# Patient Record
Sex: MALE | Race: WHITE | NOT HISPANIC OR LATINO | URBAN - METROPOLITAN AREA
[De-identification: names, ages, dates, MRNs, and addresses within clinical notes are randomized per-mention and may not be internally consistent; named-entity substitution may affect disease eponyms.]

---

## 2017-01-05 ENCOUNTER — OUTPATIENT (OUTPATIENT)
Dept: OUTPATIENT SERVICES | Facility: HOSPITAL | Age: 77
LOS: 1 days | End: 2017-01-05
Payer: MEDICARE

## 2017-01-05 ENCOUNTER — APPOINTMENT (OUTPATIENT)
Dept: ULTRASOUND IMAGING | Facility: IMAGING CENTER | Age: 77
End: 2017-01-05

## 2017-01-05 DIAGNOSIS — Z00.8 ENCOUNTER FOR OTHER GENERAL EXAMINATION: ICD-10-CM

## 2017-01-05 PROCEDURE — 76536 US EXAM OF HEAD AND NECK: CPT

## 2017-07-05 ENCOUNTER — APPOINTMENT (OUTPATIENT)
Dept: MRI IMAGING | Facility: IMAGING CENTER | Age: 77
End: 2017-07-05

## 2017-07-05 ENCOUNTER — OUTPATIENT (OUTPATIENT)
Dept: OUTPATIENT SERVICES | Facility: HOSPITAL | Age: 77
LOS: 1 days | End: 2017-07-05
Payer: MEDICARE

## 2017-07-05 DIAGNOSIS — Z00.8 ENCOUNTER FOR OTHER GENERAL EXAMINATION: ICD-10-CM

## 2017-07-05 PROCEDURE — 72148 MRI LUMBAR SPINE W/O DYE: CPT

## 2017-07-11 ENCOUNTER — APPOINTMENT (OUTPATIENT)
Dept: MRI IMAGING | Facility: CLINIC | Age: 77
End: 2017-07-11
Payer: MEDICARE

## 2017-07-11 ENCOUNTER — OUTPATIENT (OUTPATIENT)
Dept: OUTPATIENT SERVICES | Facility: HOSPITAL | Age: 77
LOS: 1 days | End: 2017-07-11
Payer: MEDICARE

## 2017-07-11 DIAGNOSIS — Z00.8 ENCOUNTER FOR OTHER GENERAL EXAMINATION: ICD-10-CM

## 2017-07-11 PROCEDURE — 74183 MRI ABD W/O CNTR FLWD CNTR: CPT | Mod: 26

## 2017-07-11 PROCEDURE — 82565 ASSAY OF CREATININE: CPT

## 2017-07-11 PROCEDURE — A9585: CPT

## 2017-07-11 PROCEDURE — 74183 MRI ABD W/O CNTR FLWD CNTR: CPT

## 2017-07-21 ENCOUNTER — RESULT REVIEW (OUTPATIENT)
Age: 77
End: 2017-07-21

## 2017-07-21 ENCOUNTER — OUTPATIENT (OUTPATIENT)
Dept: OUTPATIENT SERVICES | Facility: HOSPITAL | Age: 77
LOS: 1 days | End: 2017-07-21
Payer: MEDICARE

## 2017-07-21 DIAGNOSIS — K86.9 DISEASE OF PANCREAS, UNSPECIFIED: ICD-10-CM

## 2017-07-21 PROCEDURE — 43259 EGD US EXAM DUODENUM/JEJUNUM: CPT

## 2017-07-21 PROCEDURE — 43239 EGD BIOPSY SINGLE/MULTIPLE: CPT | Mod: XS

## 2017-07-24 LAB — SURGICAL PATHOLOGY STUDY: SIGNIFICANT CHANGE UP

## 2017-10-09 ENCOUNTER — EMERGENCY (EMERGENCY)
Facility: HOSPITAL | Age: 77
LOS: 1 days | Discharge: HOME | End: 2017-10-09
Attending: EMERGENCY MEDICINE
Payer: MEDICARE

## 2017-10-09 VITALS
WEIGHT: 169.98 LBS | OXYGEN SATURATION: 98 % | TEMPERATURE: 98 F | DIASTOLIC BLOOD PRESSURE: 89 MMHG | HEART RATE: 83 BPM | RESPIRATION RATE: 18 BRPM | SYSTOLIC BLOOD PRESSURE: 134 MMHG

## 2017-10-09 PROCEDURE — 73522 X-RAY EXAM HIPS BI 3-4 VIEWS: CPT

## 2017-10-09 PROCEDURE — 73110 X-RAY EXAM OF WRIST: CPT

## 2017-10-09 PROCEDURE — 70450 CT HEAD/BRAIN W/O DYE: CPT | Mod: 26

## 2017-10-09 PROCEDURE — 99284 EMERGENCY DEPT VISIT MOD MDM: CPT | Mod: 25

## 2017-10-09 PROCEDURE — 99285 EMERGENCY DEPT VISIT HI MDM: CPT | Mod: 25

## 2017-10-09 PROCEDURE — 70450 CT HEAD/BRAIN W/O DYE: CPT

## 2017-10-09 PROCEDURE — 73522 X-RAY EXAM HIPS BI 3-4 VIEWS: CPT | Mod: 26

## 2017-10-09 PROCEDURE — 73110 X-RAY EXAM OF WRIST: CPT | Mod: 26,LT

## 2017-10-09 PROCEDURE — 70486 CT MAXILLOFACIAL W/O DYE: CPT | Mod: 26

## 2017-10-09 PROCEDURE — 73130 X-RAY EXAM OF HAND: CPT | Mod: 26,LT

## 2017-10-09 PROCEDURE — 73130 X-RAY EXAM OF HAND: CPT

## 2017-10-09 PROCEDURE — 70486 CT MAXILLOFACIAL W/O DYE: CPT

## 2017-10-09 RX ORDER — ACETAMINOPHEN 500 MG
975 TABLET ORAL ONCE
Qty: 0 | Refills: 0 | Status: COMPLETED | OUTPATIENT
Start: 2017-10-09 | End: 2017-10-09

## 2017-10-09 RX ORDER — IBUPROFEN 200 MG
1 TABLET ORAL
Qty: 15 | Refills: 0 | OUTPATIENT
Start: 2017-10-09 | End: 2017-10-14

## 2017-10-09 RX ORDER — BACITRACIN ZINC 500 UNIT/G
1 OINTMENT IN PACKET (EA) TOPICAL ONCE
Qty: 0 | Refills: 0 | Status: COMPLETED | OUTPATIENT
Start: 2017-10-09 | End: 2017-10-09

## 2017-10-09 RX ORDER — ACETAMINOPHEN 500 MG
2 TABLET ORAL
Qty: 56 | Refills: 0 | OUTPATIENT
Start: 2017-10-09 | End: 2017-10-16

## 2017-10-09 RX ADMIN — Medication 975 MILLIGRAM(S): at 21:45

## 2017-10-09 RX ADMIN — Medication 1 TABLET(S): at 22:31

## 2017-10-09 RX ADMIN — Medication 1 APPLICATION(S): at 21:46

## 2017-10-09 NOTE — ED PROCEDURE NOTE - NS ED PROCEDURE ASSISTED BY
The procedure was performed independently Assistance was available/The procedure was performed independently

## 2017-10-09 NOTE — ED ADULT TRIAGE NOTE - CHIEF COMPLAINT QUOTE
" I tripped and fell on my face and belly ,I tried to break my fall, I have pain on my both leg and  and arm" no LOC

## 2017-10-09 NOTE — ED PROVIDER NOTE - PROGRESS NOTE DETAILS
Xray shows distal radial/ulnar fracture. Sugar tongue splint applied with sling. CT shows left maxillary sinus wall fracture and non-displaced orbital wall fracture. Will give Rx for IBU, Tylenol and Augmentin. Patient will need to follow up with PMD and ortho in 1-2 days. Care coordinator will facilitate ortho follow up. Had lengthy conversation with patient and wife about red flags to come back. Offered patient to be admitted but patient refused. Explained to him that he can come back at any time. Able to walk with cane with steady gait. Pt is well appearing walking with steady gait, stable for discharge and follow up without fail with medical doctor. I had a detailed discussion with the patient and/or guardian regarding the historical points, exam findings, and any diagnostic results supporting the discharge diagnosis. Pt educated on care and need for follow up. Strict return instructions and red flag signs and symptoms discussed with patient. Questions answered. Pt shows understanding of discharge information and agrees to follow.

## 2017-10-09 NOTE — ED PROVIDER NOTE - OBJECTIVE STATEMENT
77 year-old male, history of hypothyroidism, HTN, borderline diabetes, presents to ED for evaluation s/p fall PTA. While walking in the street, tripped over an uneven surface and fell forward, landing on both knees, tried to break fall with hands and then hit left side of the face on the ground. Denies LOC. Denies dizziness, chest pain, palpitations, SOB or generalized weakness prior or post fall. Now c/o left sided facial pain, left wrist/hand pain and right leg pain. Pain is sharp, moderate-severe, aggravated with movement, no alleviating factors. Denies headache, visual changes, dizziness, N/V, numbness/tingling/focal weakness or any other complaints. Last tetanus immunization < 5 years. Takes Aspirin, otherwise no other blood thinners.

## 2017-10-09 NOTE — ED PROVIDER NOTE - ATTENDING CONTRIBUTION TO CARE
77 year old male PMHx HTN, thyroid disorder c/o wrist and  face pain s/p mechanical fall today. PE: left facial mild swelling and tenderness, left wrist mild deformity and swelling, neurovascularly intact. I&P: facial fracture and wrist fracture, splint, abx, analgesics, ortho and OMFS follow up

## 2017-10-09 NOTE — ED PROCEDURE NOTE - CPROC ED POST PROC CARE GUIDE1
Keep the cast/splint/dressing clean and dry./Instructed patient/caregiver to follow-up with primary care physician./Elevate the injured extremity as instructed./Verbal/written post procedure instructions were given to patient/caregiver./Instructed patient/caregiver regarding signs and symptoms of infection.

## 2017-10-09 NOTE — ED PROVIDER NOTE - CARDIAC, MLM
Normal rate, regular rhythm.  Heart sounds S1, S2. No chest or ribcage ecchymosis, tenderness or deformity.

## 2017-10-09 NOTE — ED PROVIDER NOTE - MEDICAL DECISION MAKING DETAILS
77 year-old male, presents for evaluation s/p mechanical fall earlier today. Appears uncomfortable, vital signs within normal limits, afebrile. No focal neuro deficits. High suspicion of left wrist fracture. Plan: CT, xrays, meds, Bacitracin, re-assess.

## 2017-10-09 NOTE — ED PROVIDER NOTE - HEAD SHAPE
normal cephalic/Left sided facial abrasion and slight ecchymosis under the left eye. Mild left sided point tenderness to zygomatic bone.

## 2017-10-09 NOTE — ED ADULT NURSE NOTE - OBJECTIVE STATEMENT
Patient presents to ED s/p trip and fall on the sidewalk. Abrasions noted to face. Denies loss of consciousness, head trauma. Patient presents to ED s/p trip and fall on the sidewalk.  Patient landed on both knees, tried to break fall with both hands in front of him. Patient complains of pain to right leg, left hand, and left facial pain. Right leg pain is worse with movement. Abrasions noted to face, bilateral hands, right knee. Denies loss of consciousness, head trauma. Breathing easy and unlabored, no use of accessory muscles. Denies chest pain, dizziness, headache, nausea, vomiting. Wife at bedside.

## 2017-10-09 NOTE — ED ADULT NURSE REASSESSMENT NOTE - NS ED NURSE REASSESS COMMENT FT1
Splint and sling in place to left arm by SUNDEEP López. Bacitracin placed on abrasions. Patient to follow up with orthopedic MD. Patient discharged with medications. Patient provided with cane, ambulated with steady gait. Wife at bedside.

## 2017-10-09 NOTE — ED PROVIDER NOTE - PHYSICAL EXAMINATION
Neck full range of motion. No spinal/paraspinal tenderness, deformity or step-offs. Bilateral hips full range of motion without tenderness to palpation. Left wrist swelling, deformity, with tenderness to palpation. Left hand swelling with abrasion, no bony tenderness. Bilateral knees with abrasion, full range of motion, no bony tenderness. Bilateral shoulders with full range of motion, no tenderness or swelling.

## 2017-10-09 NOTE — ED PROVIDER NOTE - CARE PLAN
Principal Discharge DX:	Forearm fracture  Secondary Diagnosis:	Maxillary sinus fracture  Secondary Diagnosis:	Orbital wall fracture Principal Discharge DX:	Wrist fracture, closed, left, initial encounter  Secondary Diagnosis:	Maxillary sinus fracture  Secondary Diagnosis:	Orbital wall fracture

## 2017-10-12 ENCOUNTER — EMERGENCY (EMERGENCY)
Facility: HOSPITAL | Age: 77
LOS: 1 days | Discharge: HOME | End: 2017-10-12
Attending: EMERGENCY MEDICINE
Payer: MEDICARE

## 2017-10-12 VITALS
RESPIRATION RATE: 18 BRPM | SYSTOLIC BLOOD PRESSURE: 130 MMHG | HEIGHT: 68 IN | OXYGEN SATURATION: 97 % | WEIGHT: 190.04 LBS | DIASTOLIC BLOOD PRESSURE: 95 MMHG | HEART RATE: 86 BPM | TEMPERATURE: 98 F

## 2017-10-12 PROCEDURE — 73110 X-RAY EXAM OF WRIST: CPT | Mod: 26,LT

## 2017-10-12 PROCEDURE — 99283 EMERGENCY DEPT VISIT LOW MDM: CPT | Mod: 25

## 2017-10-12 PROCEDURE — 99284 EMERGENCY DEPT VISIT MOD MDM: CPT | Mod: 25

## 2017-10-12 PROCEDURE — 73110 X-RAY EXAM OF WRIST: CPT

## 2017-10-12 PROCEDURE — 29105 APPLICATION LONG ARM SPLINT: CPT

## 2017-10-12 PROCEDURE — 29105 APPLICATION LONG ARM SPLINT: CPT | Mod: LT

## 2017-10-12 RX ORDER — OXYCODONE AND ACETAMINOPHEN 5; 325 MG/1; MG/1
1 TABLET ORAL ONCE
Qty: 0 | Refills: 0 | Status: DISCONTINUED | OUTPATIENT
Start: 2017-10-12 | End: 2017-10-12

## 2017-10-12 RX ADMIN — OXYCODONE AND ACETAMINOPHEN 1 TABLET(S): 5; 325 TABLET ORAL at 23:43

## 2017-10-12 NOTE — ED PROVIDER NOTE - UPPER EXTREMITY EXAM, LEFT
marked swelling to the L wrist and L hand/L fingers; all digits neurovascularly intact with good capillary refill of <2 seconds and pulses intact

## 2017-10-12 NOTE — ED PROVIDER NOTE - NS PRO PASSIVE SMOKE EXP
Ladonna will be seeing Dr Schwab from [facility] for preop that has been scheduled for 7/12 1:30pm   No

## 2017-10-12 NOTE — ED PROVIDER NOTE - OBJECTIVE STATEMENT
78 y/o M pt with PMHx of Borderline DM, HTN, and Hypothyroidism and no significant PSHx presents to ED c/o L arm pain for several days. Pt was seen in ED several days ago s/p a mechanical trip and fall resulting in a L distal radius and ulnar fracture; pt was splinted at the time and told to f/u with an orthopedist, which pt did. Pt states he is currently scheduled for surgery on 10/16/2017. As per pt, pt's orthopedic physician changed pt's splint to a short-arm splint; now, pt is reporting worsened pain and swelling to L arm since new short-arm splint was placed. Pt has been taking 650mg Tylenol every x12 hours as well as Motrin 600mg every x12 hours for pan relief; pt has also been applying ice to his L arm infrequently. Pt denies any other complaints. Pt also denies further trauma to the area, or elevating L arm on pillows. Pt is otherwise feeling well. NKDA.

## 2017-10-12 NOTE — ED PROVIDER NOTE - PROGRESS NOTE DETAILS
pt reports feeling much better after removal of 1st splint, moving all fingers, swelling more evenly distributed, cap refill <2sec, sensation intact, compartments soft. long arm posterior splint and sling reapplied- pt reports feeling much more secure, ice packs/pillow elevation , increase dose intervals of pain meds, will return to ER immed for worsening s/s of any other concerns

## 2017-10-12 NOTE — ED PROVIDER NOTE - MEDICAL DECISION MAKING DETAILS
76 y/o M pt presents with L arm pain. Pt with known L distal radius and ulnar fracture. Will obtain X-Ray of L wrist, re-splint in a long arm splint (which will make pt feel more secure), provide pain medication, and plan to d/c home with pt f/u for planned surgery in x3 days.

## 2017-10-13 ENCOUNTER — OUTPATIENT (OUTPATIENT)
Dept: OUTPATIENT SERVICES | Facility: HOSPITAL | Age: 77
LOS: 1 days | End: 2017-10-13
Payer: MEDICARE

## 2017-10-13 VITALS
DIASTOLIC BLOOD PRESSURE: 64 MMHG | RESPIRATION RATE: 16 BRPM | HEART RATE: 71 BPM | HEIGHT: 68.5 IN | TEMPERATURE: 98 F | WEIGHT: 199.08 LBS | SYSTOLIC BLOOD PRESSURE: 128 MMHG | OXYGEN SATURATION: 98 %
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
## 2017-10-13 DIAGNOSIS — S52.532A COLLES' FRACTURE OF LEFT RADIUS, INITIAL ENCOUNTER FOR CLOSED FRACTURE: ICD-10-CM

## 2017-10-13 DIAGNOSIS — E78.5 HYPERLIPIDEMIA, UNSPECIFIED: ICD-10-CM

## 2017-10-13 DIAGNOSIS — E11.9 TYPE 2 DIABETES MELLITUS WITHOUT COMPLICATIONS: ICD-10-CM

## 2017-10-13 DIAGNOSIS — Z90.89 ACQUIRED ABSENCE OF OTHER ORGANS: Chronic | ICD-10-CM

## 2017-10-13 DIAGNOSIS — E03.9 HYPOTHYROIDISM, UNSPECIFIED: ICD-10-CM
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
## 2017-10-13 DIAGNOSIS — Z90.49 ACQUIRED ABSENCE OF OTHER SPECIFIED PARTS OF DIGESTIVE TRACT: Chronic | ICD-10-CM

## 2017-10-13 DIAGNOSIS — S52.502A UNSPECIFIED FRACTURE OF THE LOWER END OF LEFT RADIUS, INITIAL ENCOUNTER FOR CLOSED FRACTURE: ICD-10-CM
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
## 2017-10-13 DIAGNOSIS — G47.33 OBSTRUCTIVE SLEEP APNEA (ADULT) (PEDIATRIC): ICD-10-CM

## 2017-10-13 DIAGNOSIS — I10 ESSENTIAL (PRIMARY) HYPERTENSION: ICD-10-CM

## 2017-10-13 DIAGNOSIS — L72.9 FOLLICULAR CYST OF THE SKIN AND SUBCUTANEOUS TISSUE, UNSPECIFIED: Chronic | ICD-10-CM

## 2017-10-13 LAB
BUN SERPL-MCNC: 17 MG/DL — SIGNIFICANT CHANGE UP (ref 7–23)
CALCIUM SERPL-MCNC: 8.6 MG/DL — SIGNIFICANT CHANGE UP (ref 8.4–10.5)
CHLORIDE SERPL-SCNC: 102 MMOL/L — SIGNIFICANT CHANGE UP (ref 98–107)
CO2 SERPL-SCNC: 26 MMOL/L — SIGNIFICANT CHANGE UP (ref 22–31)
CREAT SERPL-MCNC: 0.88 MG/DL — SIGNIFICANT CHANGE UP (ref 0.5–1.3)
GLUCOSE SERPL-MCNC: 170 MG/DL — HIGH (ref 70–99)
HBA1C BLD-MCNC: 7 % — HIGH (ref 4–5.6)
HCT VFR BLD CALC: 35.5 % — LOW (ref 39–50)
HGB BLD-MCNC: 12 G/DL — LOW (ref 13–17)
MCHC RBC-ENTMCNC: 33.8 % — SIGNIFICANT CHANGE UP (ref 32–36)
MCHC RBC-ENTMCNC: 34.1 PG — HIGH (ref 27–34)
MCV RBC AUTO: 100.9 FL — HIGH (ref 80–100)
NRBC # FLD: 0 — SIGNIFICANT CHANGE UP
PLATELET # BLD AUTO: 173 K/UL — SIGNIFICANT CHANGE UP (ref 150–400)
PMV BLD: 9.7 FL — SIGNIFICANT CHANGE UP (ref 7–13)
POTASSIUM SERPL-MCNC: 4.7 MMOL/L — SIGNIFICANT CHANGE UP (ref 3.5–5.3)
POTASSIUM SERPL-SCNC: 4.7 MMOL/L — SIGNIFICANT CHANGE UP (ref 3.5–5.3)
RBC # BLD: 3.52 M/UL — LOW (ref 4.2–5.8)
RBC # FLD: 12.8 % — SIGNIFICANT CHANGE UP (ref 10.3–14.5)
SODIUM SERPL-SCNC: 139 MMOL/L — SIGNIFICANT CHANGE UP (ref 135–145)
WBC # BLD: 7.9 K/UL — SIGNIFICANT CHANGE UP (ref 3.8–10.5)
WBC # FLD AUTO: 7.9 K/UL — SIGNIFICANT CHANGE UP (ref 3.8–10.5)

## 2017-10-13 PROCEDURE — 93010 ELECTROCARDIOGRAM REPORT: CPT

## 2017-10-13 NOTE — H&P PST ADULT - HISTORY OF PRESENT ILLNESS
76 y/o male with PMH of HTN, Hypothyroidism, HLD, BPH, DM. Presents to PST with a preop dx of colles' fracture of left radius, initial encounter for close fracture   Pt states on monday 10/9/17 at 7 pm was walking on a side street and was dark. encounter an uneven area and fell and tripped face forward injuring his left eye, head and mainly rt arm/wrist and B/L extremities. Pt went to [facility] were an axb was given for left eye, xrays were done and ct scan of head. X-rays revealed fractured left wrist. a splint given and referred to an orthopedic specialist for f/u. pt went to [facility] urgent care a day after the ER visit since pain worsen, X-rays were repeated and a CD given. Pt also referred to see an orthopedic doctor. Pt went to see Dr Fisher who changed splint and recommended surgical intervention at this time.  pt went back to the ER last night due to increased swelling to the extremity and pain. " a pain killer" was given and splint replaced again. 76 y/o male with PMH of HTN, Hypothyroidism, HLD, BPH, DM. Presents to PST with a preop dx of colles' fracture of left radius, initial encounter for close fracture and to be evaluated for a scheduled ORIF Left distal radius with exparel on 10/16/17.  Pt states on monday 10/9/17 at 7 pm was walking on his neighborhood on a side street and was dark. Pt encountered an uneven area so fell and tripped faceing forward injuring his left eye, head, leftt arm/wrist and B/L extremities. Pt went to [facility] were an axb was given for left eye prophylactically), x-rays were done and ct scan of head. X-rays revealed fractured left wrist. a splint given and referred to an orthopedic specialist for f/u. pt went to [facility] urgent care a day after the ER visit since pain worsen, X-rays were repeated and a CD given. Pt also referred to see an orthopedic doctor. Pt went to see Dr Fisher who changed splint and recommended surgical intervention at this time.  pt went back to the ER last night due to increased swelling to the left extremity and pain on his back and legs. " a pain killer" was given and splint replaced again.

## 2017-10-13 NOTE — H&P PST ADULT - FAMILY HISTORY
Mother  Still living? No  Family history of breast cancer, Age at diagnosis: Age Unknown  Family history of heart disease, Age at diagnosis: Age Unknown     Father  Still living? No  Family history of diabetes mellitus (DM), Age at diagnosis: Age Unknown     Sibling  Still living? Yes, Estimated age: Age Unknown  Family history of colon cancer, Age at diagnosis: Age Unknown

## 2017-10-13 NOTE — H&P PST ADULT - PROBLEM SELECTOR PLAN 5
pt aware to take last Metformin Dose in am on 10/15/17. do not take on 10/16/17. FS ordered for am of sx.

## 2017-10-13 NOTE — H&P PST ADULT - NEGATIVE MALE-SPECIFIC SYMPTOMS
no urethral discharge/no ejaculatory dysfunction/no scrotal mass L/no genital sores/no undescended testicle L/no scrotal mass R/no impotence/no erectile dysfunction/no undescended testicle R

## 2017-10-13 NOTE — H&P PST ADULT - NEGATIVE NEUROLOGICAL SYMPTOMS
no facial palsy/no loss of consciousness/no syncope/no transient paralysis/no hemiparesis/no headache/no generalized seizures/no confusion/no focal seizures/no tremors/no vertigo/no loss of sensation

## 2017-10-13 NOTE — H&P PST ADULT - NEGATIVE BREAST SYMPTOMS
no breast lump R/no nipple discharge L/no nipple discharge R/no breast lump L/no breast tenderness L/no breast tenderness R

## 2017-10-13 NOTE — H&P PST ADULT - PROBLEM SELECTOR PLAN 1
pt is scheduled for an ORIF Left distal radius with Exparel on 10/16/17. Preop instructions including Pepcid, NPO status, stop ASA/NSAID's and MVI today. Pt to continue Augmentin, finestiride as ordered. Verbalized understanding  States saw PCP this wk, aware of Sx planned.

## 2017-10-13 NOTE — H&P PST ADULT - RS GEN PE MLT RESP DETAILS PC
airway patent/no intercostal retractions/no chest wall tenderness/no rhonchi/no subcutaneous emphysema/good air movement/clear to auscultation bilaterally/no rales/breath sounds equal/no wheezes

## 2017-10-13 NOTE — H&P PST ADULT - PMH
DM (diabetes mellitus)    HLD (hyperlipidemia)    HLD (hyperlipidemia)    HTN (hypertension)    Hypothyroidism

## 2017-10-13 NOTE — H&P PST ADULT - NSANTHOSAYNRD_GEN_A_CORE
never tested/No. SADIE screening performed.  STOP BANG Legend: 0-2 = LOW Risk; 3-4 = INTERMEDIATE Risk; 5-8 = HIGH Risk

## 2017-10-13 NOTE — H&P PST ADULT - NEGATIVE PSYCHIATRIC SYMPTOMS
no depression/no mood swings/no anxiety/no auditory hallucinations/no insomnia/no visual hallucinations/no paranoia/no suicidal ideation/no agitation

## 2017-10-13 NOTE — H&P PST ADULT - NEGATIVE GASTROINTESTINAL SYMPTOMS
no nausea/no constipation/no melena/no hematochezia/no flatulence/no diarrhea/no vomiting/no change in bowel habits/no abdominal pain

## 2017-10-13 NOTE — H&P PST ADULT - NEGATIVE GENERAL GENITOURINARY SYMPTOMS
no flank pain R/no urine discoloration/no bladder infections/normal urinary frequency/no nocturia/no incontinence/no renal colic/no dysuria/no urinary hesitancy/no flank pain L/no hematuria

## 2017-10-13 NOTE — H&P PST ADULT - NEGATIVE OPHTHALMOLOGIC SYMPTOMS
no lacrimation R/no irritation R/no discharge L/no diplopia/no scleral injection L/no blurred vision R/no pain R/no loss of vision L/no discharge R/no blurred vision L/no lacrimation L/no loss of vision R/no scleral injection R/no photophobia/no pain L

## 2017-10-13 NOTE — H&P PST ADULT - NEGATIVE CARDIOVASCULAR SYMPTOMS
no paroxysmal nocturnal dyspnea/no orthopnea/no dyspnea on exertion/no chest pain/no peripheral edema/no claudication/no palpitations

## 2017-10-13 NOTE — H&P PST ADULT - GENITOURINARY COMMENTS
Shadow seen on US and has a scheduled cystoscopy for 10/30/17. States a Shadow noted on his bladder on a pelvic US and has a scheduled cystoscopy for 10/30/17.

## 2017-10-13 NOTE — H&P PST ADULT - NEGATIVE SKIN SYMPTOMS
no rash/no hair loss/no tumor/no dryness/no change in size/color of mole/no brittle nails/no itching/no pitted nails

## 2017-10-13 NOTE — H&P PST ADULT - GASTROINTESTINAL DETAILS
no rebound tenderness/no guarding/no bruit/no organomegaly/no distention/no masses palpable/bowel sounds normal/no rigidity/soft

## 2017-10-13 NOTE — H&P PST ADULT - NEGATIVE GENERAL SYMPTOMS
no sweating/no polyuria/no malaise/no chills/no fever/no weight gain/no fatigue/no anorexia/no weight loss/no polyphagia/no polydipsia

## 2017-10-14 DIAGNOSIS — S52.202D UNSPECIFIED FRACTURE OF SHAFT OF LEFT ULNA, SUBSEQUENT ENCOUNTER FOR CLOSED FRACTURE WITH ROUTINE HEALING: ICD-10-CM

## 2017-10-14 DIAGNOSIS — I10 ESSENTIAL (PRIMARY) HYPERTENSION: ICD-10-CM
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
## 2017-10-14 DIAGNOSIS — S52.502D UNSPECIFIED FRACTURE OF THE LOWER END OF LEFT RADIUS, SUBSEQUENT ENCOUNTER FOR CLOSED FRACTURE WITH ROUTINE HEALING: ICD-10-CM

## 2017-10-14 DIAGNOSIS — E03.9 HYPOTHYROIDISM, UNSPECIFIED: ICD-10-CM

## 2017-10-14 DIAGNOSIS — R73.03 PREDIABETES: ICD-10-CM

## 2017-10-16 ENCOUNTER — OUTPATIENT (OUTPATIENT)
Dept: OUTPATIENT SERVICES | Facility: HOSPITAL | Age: 77
LOS: 1 days | Discharge: HOME | End: 2017-10-16

## 2017-10-16 ENCOUNTER — TRANSCRIPTION ENCOUNTER (OUTPATIENT)
Age: 77
End: 2017-10-16

## 2017-10-16 VITALS
SYSTOLIC BLOOD PRESSURE: 96 MMHG | RESPIRATION RATE: 16 BRPM | HEIGHT: 68.5 IN | WEIGHT: 199.08 LBS | TEMPERATURE: 98 F | OXYGEN SATURATION: 96 % | DIASTOLIC BLOOD PRESSURE: 72 MMHG | HEART RATE: 72 BPM

## 2017-10-16 VITALS — OXYGEN SATURATION: 100 % | HEART RATE: 83 BPM | DIASTOLIC BLOOD PRESSURE: 77 MMHG | SYSTOLIC BLOOD PRESSURE: 125 MMHG

## 2017-10-16 DIAGNOSIS — S52.532A COLLES' FRACTURE OF LEFT RADIUS, INITIAL ENCOUNTER FOR CLOSED FRACTURE: ICD-10-CM

## 2017-10-16 DIAGNOSIS — L72.9 FOLLICULAR CYST OF THE SKIN AND SUBCUTANEOUS TISSUE, UNSPECIFIED: Chronic | ICD-10-CM

## 2017-10-16 DIAGNOSIS — Z90.89 ACQUIRED ABSENCE OF OTHER ORGANS: Chronic | ICD-10-CM

## 2017-10-16 DIAGNOSIS — Z90.49 ACQUIRED ABSENCE OF OTHER SPECIFIED PARTS OF DIGESTIVE TRACT: Chronic | ICD-10-CM

## 2017-10-16 LAB — GLUCOSE BLDC GLUCOMTR-MCNC: 147 MG/DL — HIGH (ref 70–99)

## 2017-10-16 RX ORDER — ONDANSETRON 8 MG/1
1 TABLET, FILM COATED ORAL
Qty: 15 | Refills: 0 | OUTPATIENT
Start: 2017-10-16 | End: 2017-10-21

## 2017-10-16 NOTE — ASU DISCHARGE PLAN (ADULT/PEDIATRIC). - ACTIVITY LEVEL
elevate extremity/no weight bearing/see post operative instruction sheet from DR FOSTER on execise of hand

## 2018-06-08 ENCOUNTER — OUTPATIENT (OUTPATIENT)
Dept: OUTPATIENT SERVICES | Facility: HOSPITAL | Age: 78
LOS: 1 days | End: 2018-06-08
Payer: MEDICARE

## 2018-06-08 ENCOUNTER — APPOINTMENT (OUTPATIENT)
Dept: MRI IMAGING | Facility: IMAGING CENTER | Age: 78
End: 2018-06-08
Payer: MEDICARE

## 2018-06-08 DIAGNOSIS — Z90.49 ACQUIRED ABSENCE OF OTHER SPECIFIED PARTS OF DIGESTIVE TRACT: Chronic | ICD-10-CM

## 2018-06-08 DIAGNOSIS — L72.9 FOLLICULAR CYST OF THE SKIN AND SUBCUTANEOUS TISSUE, UNSPECIFIED: Chronic | ICD-10-CM

## 2018-06-08 DIAGNOSIS — Z90.89 ACQUIRED ABSENCE OF OTHER ORGANS: Chronic | ICD-10-CM

## 2018-06-08 DIAGNOSIS — Z00.8 ENCOUNTER FOR OTHER GENERAL EXAMINATION: ICD-10-CM

## 2018-06-08 PROCEDURE — A9585: CPT

## 2018-06-08 PROCEDURE — 82565 ASSAY OF CREATININE: CPT

## 2018-06-08 PROCEDURE — 70553 MRI BRAIN STEM W/O & W/DYE: CPT

## 2018-06-08 PROCEDURE — 70553 MRI BRAIN STEM W/O & W/DYE: CPT | Mod: 26

## 2018-07-16 PROBLEM — R73.03 PREDIABETES: Chronic | Status: INACTIVE | Noted: 2017-10-09 | Resolved: 2017-10-13

## 2021-06-15 PROBLEM — I10 ESSENTIAL (PRIMARY) HYPERTENSION: Chronic | Status: ACTIVE | Noted: 2017-10-09

## 2021-06-15 PROBLEM — E78.5 HYPERLIPIDEMIA, UNSPECIFIED: Chronic | Status: ACTIVE | Noted: 2017-10-13

## 2021-06-15 PROBLEM — E11.9 TYPE 2 DIABETES MELLITUS WITHOUT COMPLICATIONS: Chronic | Status: ACTIVE | Noted: 2017-10-13

## 2021-06-15 PROBLEM — E03.9 HYPOTHYROIDISM, UNSPECIFIED: Chronic | Status: ACTIVE | Noted: 2017-10-09

## 2021-08-31 ENCOUNTER — APPOINTMENT (OUTPATIENT)
Dept: GASTROENTEROLOGY | Facility: CLINIC | Age: 81
End: 2021-08-31
Payer: MEDICARE

## 2021-08-31 VITALS
BODY MASS INDEX: 29.1 KG/M2 | HEIGHT: 68 IN | SYSTOLIC BLOOD PRESSURE: 141 MMHG | DIASTOLIC BLOOD PRESSURE: 79 MMHG | WEIGHT: 192 LBS | HEART RATE: 68 BPM

## 2021-08-31 DIAGNOSIS — Z87.891 PERSONAL HISTORY OF NICOTINE DEPENDENCE: ICD-10-CM

## 2021-08-31 DIAGNOSIS — Z80.52 FAMILY HISTORY OF MALIGNANT NEOPLASM OF BLADDER: ICD-10-CM

## 2021-08-31 DIAGNOSIS — K57.30 DIVERTICULOSIS OF LARGE INTESTINE W/OUT PERFORATION OR ABSCESS W/OUT BLEEDING: ICD-10-CM

## 2021-08-31 DIAGNOSIS — R19.5 OTHER FECAL ABNORMALITIES: ICD-10-CM

## 2021-08-31 DIAGNOSIS — E66.3 OVERWEIGHT: ICD-10-CM

## 2021-08-31 DIAGNOSIS — E11.9 TYPE 2 DIABETES MELLITUS W/OUT COMPLICATIONS: ICD-10-CM

## 2021-08-31 DIAGNOSIS — Z80.3 FAMILY HISTORY OF MALIGNANT NEOPLASM OF BREAST: ICD-10-CM

## 2021-08-31 DIAGNOSIS — Z86.010 PERSONAL HISTORY OF COLONIC POLYPS: ICD-10-CM

## 2021-08-31 DIAGNOSIS — N40.0 BENIGN PROSTATIC HYPERPLASIA WITHOUT LOWER URINARY TRACT SYMPMS: ICD-10-CM

## 2021-08-31 DIAGNOSIS — I10 ESSENTIAL (PRIMARY) HYPERTENSION: ICD-10-CM

## 2021-08-31 DIAGNOSIS — Z72.89 OTHER PROBLEMS RELATED TO LIFESTYLE: ICD-10-CM

## 2021-08-31 DIAGNOSIS — J30.2 OTHER SEASONAL ALLERGIC RHINITIS: ICD-10-CM

## 2021-08-31 DIAGNOSIS — K42.9 UMBILICAL HERNIA W/OUT OBSTRUCTION OR GANGRENE: ICD-10-CM

## 2021-08-31 DIAGNOSIS — E03.9 HYPOTHYROIDISM, UNSPECIFIED: ICD-10-CM

## 2021-08-31 PROCEDURE — 82274 ASSAY TEST FOR BLOOD FECAL: CPT | Mod: QW

## 2021-08-31 PROCEDURE — 99204 OFFICE O/P NEW MOD 45 MIN: CPT

## 2021-08-31 PROCEDURE — 99072 ADDL SUPL MATRL&STAF TM PHE: CPT

## 2021-08-31 RX ORDER — ASCORBIC ACID 500 MG
TABLET ORAL
Refills: 0 | Status: ACTIVE | COMMUNITY

## 2021-08-31 NOTE — ASSESSMENT
[FreeTextEntry1] : 1.  Heme positive stool on today's exam may be from internal hemorrhoids or the hard stool from early today. Nevertheless, he has history of colonic tubular adenomas, and is on aspirin, which could cause irritation anywhere in the gut.  Left-sided diverticulosis and hemorrhoids at last colonoscopy July 2016.\par 2.  Umbilical hernia, asymptomatic.\par 3.  Overweight.\par 4.  Hypertension.\par 5.  Hypothyroidism.\par 6.  Type 2 diabetes mellitus, maintained on Metformin.\par 7.  Ex-smoker.\par 8.  BPH.\par 9.  Status post T&A, appendectomy.\par 10.  Seasonal allergies.\par \par Suggest:\par 1.  Dr. Rodríguez to forward latest labs for my review.\par 2.  Given easy bruisability and heme positive stool on today's exam, he was advised to speak with PMD regarding whether he really needs to continue baby aspirin indefinitely.\par 3.  He would be due for colonoscopic surveillance, although age >80 makes us hesitant.  However, with heme positive stool on today's exam, I would suggest proceeding with colonoscopy, and even EGD if colonoscopy is unrevealing.  He was advised to speak with PMD regarding same.  Procedures, rationale, alternatives, material risks, anesthesia plan, and MiraLAX prep instructions were reviewed and brochures given.  Hold Metformin on the day of the procedure.\par

## 2021-08-31 NOTE — REASON FOR VISIT
[Follow-Up: _____] : a [unfilled] follow-up visit [Spouse] : spouse [FreeTextEntry1] : Possible colonoscopy exam

## 2021-08-31 NOTE — HISTORY OF PRESENT ILLNESS
[FreeTextEntry1] : Darshan has history of colonic tubular adenomas; left-sided diverticulosis and hemorrhoids were noted at last colonoscopy July 2016.  Earlier today, he had a hard stool, straining to defecate.  He has been taking baby aspirin regularly for quite some time, but notices easy bruisability.  Family history is negative for colorectal neoplasia.

## 2021-08-31 NOTE — PHYSICAL EXAM
[General Appearance - Alert] : alert [General Appearance - In No Acute Distress] : in no acute distress [General Appearance - Well Nourished] : well nourished [General Appearance - Well Developed] : well developed [Sclera] : the sclera and conjunctiva were normal [Neck Appearance] : the appearance of the neck was normal [Neck Cervical Mass (___cm)] : no neck mass was observed [Jugular Venous Distention Increased] : there was no jugular-venous distention [Thyroid Diffuse Enlargement] : the thyroid was not enlarged [Thyroid Nodule] : there were no palpable thyroid nodules [Auscultation Breath Sounds / Voice Sounds] : lungs were clear to auscultation bilaterally [Heart Rate And Rhythm] : heart rate was normal and rhythm regular [Heart Sounds] : normal S1 and S2 [Heart Sounds Gallop] : no gallops [Murmurs] : no murmurs [Heart Sounds Pericardial Friction Rub] : no pericardial rub [Full Pulse] : the pedal pulses are present [Edema] : there was no peripheral edema [Bowel Sounds] : normal bowel sounds [Abdomen Soft] : soft [Abdomen Tenderness] : non-tender [Abdomen Mass (___ Cm)] : no abdominal mass palpated [Normal Sphincter Tone] : normal sphincter tone [No Rectal Mass] : no rectal mass [Internal Hemorrhoid] : internal hemorrhoids [Occult Blood Positive] : stool positive for occult blood [Prostate Size___ (Scale 0-4)] : prostate size was [unfilled] on a scale of 0-4 [Cervical Lymph Nodes Enlarged Posterior Bilaterally] : posterior cervical [Cervical Lymph Nodes Enlarged Anterior Bilaterally] : anterior cervical [Supraclavicular Lymph Nodes Enlarged Bilaterally] : supraclavicular [Inguinal Lymph Nodes Enlarged Bilaterally] : inguinal [Nail Clubbing] : no clubbing  or cyanosis of the fingernails [Musculoskeletal - Swelling] : no joint swelling seen [Skin Turgor] : normal skin turgor [] : no rash [Oriented To Time, Place, And Person] : oriented to person, place, and time [Impaired Insight] : insight and judgment were intact [Affect] : the affect was normal [External Hemorrhoid] : no external hemorrhoids [Prostate Tenderness] : was not tender [FreeTextEntry1] : multiple ecchymoses; cherry angiomata

## 2021-08-31 NOTE — REVIEW OF SYSTEMS
[Hesitancy] : urinary hesitancy [Limb Pain] : limb pain [Difficulty Walking] : difficulty walking [Erectile Dysfunction] : erectile dysfunction [Feelings Of Weakness] : feelings of weakness [Easy Bruising] : a tendency for easy bruising [Negative] : Psychiatric [As Noted in HPI] : as noted in HPI

## 2021-08-31 NOTE — CONSULT LETTER
[Dear  ___] : Dear  [unfilled], [Consult Letter:] : I had the pleasure of evaluating your patient, [unfilled]. [Please see my note below.] : Please see my note below. [Consult Closing:] : Thank you very much for allowing me to participate in the care of this patient.  If you have any questions, please do not hesitate to contact me. [Sincerely,] : Sincerely, [FreeTextEntry3] : Jordy Oviedo M.D.\par

## 2021-09-08 RX ORDER — PRAVASTATIN SODIUM 40 MG/1
40 TABLET ORAL
Refills: 0 | Status: ACTIVE | COMMUNITY

## 2021-09-08 RX ORDER — TAMSULOSIN HYDROCHLORIDE 0.4 MG/1
0.4 CAPSULE ORAL
Refills: 0 | Status: ACTIVE | COMMUNITY

## 2021-09-08 RX ORDER — METOPROLOL TARTRATE 75 MG/1
75 TABLET, FILM COATED ORAL
Refills: 0 | Status: ACTIVE | COMMUNITY

## 2021-09-08 RX ORDER — ASPIRIN 325 MG/1
TABLET, FILM COATED ORAL
Refills: 0 | Status: DISCONTINUED | COMMUNITY
End: 2021-09-08

## 2021-09-08 RX ORDER — GLIMEPIRIDE 1 MG/1
1 TABLET ORAL
Refills: 0 | Status: ACTIVE | COMMUNITY

## 2021-09-08 RX ORDER — VIT C/E/ZN/COPPR/LUTEIN/ZEAXAN 250MG-90MG
CAPSULE ORAL
Refills: 0 | Status: ACTIVE | COMMUNITY

## 2021-09-08 RX ORDER — RAMIPRIL 10 MG/1
10 CAPSULE ORAL
Refills: 0 | Status: ACTIVE | COMMUNITY

## 2021-09-08 RX ORDER — METFORMIN HYDROCHLORIDE 500 MG/1
500 TABLET, COATED ORAL
Refills: 0 | Status: ACTIVE | COMMUNITY

## 2021-09-08 RX ORDER — LEVOTHYROXINE SODIUM 150 UG/1
150 TABLET ORAL
Refills: 0 | Status: ACTIVE | COMMUNITY

## 2021-09-08 RX ORDER — FINASTERIDE 5 MG/1
5 TABLET, FILM COATED ORAL
Refills: 0 | Status: ACTIVE | COMMUNITY

## 2021-09-08 RX ORDER — METFORMIN HYDROCHLORIDE 625 MG/1
TABLET ORAL
Refills: 0 | Status: DISCONTINUED | COMMUNITY
End: 2021-09-08

## 2021-09-08 RX ORDER — B-COMPLEX WITH VITAMIN C
TABLET ORAL
Refills: 0 | Status: ACTIVE | COMMUNITY

## 2021-09-08 RX ORDER — CHOLECALCIFEROL (VITAMIN D3) 25 MCG
TABLET ORAL
Refills: 0 | Status: ACTIVE | COMMUNITY

## 2021-09-10 DIAGNOSIS — Z01.818 ENCOUNTER FOR OTHER PREPROCEDURAL EXAMINATION: ICD-10-CM

## 2021-09-15 ENCOUNTER — APPOINTMENT (OUTPATIENT)
Dept: DISASTER EMERGENCY | Facility: CLINIC | Age: 81
End: 2021-09-15

## 2021-09-16 LAB — SARS-COV-2 N GENE NPH QL NAA+PROBE: NOT DETECTED

## 2021-09-17 ENCOUNTER — APPOINTMENT (OUTPATIENT)
Dept: DISASTER EMERGENCY | Facility: CLINIC | Age: 81
End: 2021-09-17

## 2021-09-20 ENCOUNTER — APPOINTMENT (OUTPATIENT)
Dept: GASTROENTEROLOGY | Facility: CLINIC | Age: 81
End: 2021-09-20
Payer: MEDICARE

## 2021-09-20 PROCEDURE — 45378 DIAGNOSTIC COLONOSCOPY: CPT

## 2021-09-20 PROCEDURE — 99072 ADDL SUPL MATRL&STAF TM PHE: CPT

## 2021-10-27 NOTE — ASU PREOP CHECKLIST - AS TEMP SITE
Quality 110: Preventive Care And Screening: Influenza Immunization: Influenza Immunization not Administered because Patient Refused. Quality 111:Pneumonia Vaccination Status For Older Adults: Pneumococcal Vaccination not Administered or Previously Received, Reason not Otherwise Specified Detail Level: Detailed Quality 402: Tobacco Use And Help With Quitting Among Adolescents: Patient screened for tobacco and never smoked Quality 431: Preventive Care And Screening: Unhealthy Alcohol Use - Screening: Patient not identified as an unhealthy alcohol user when screened for unhealthy alcohol use using a systematic screening method tympanic

## 2022-02-28 NOTE — H&P PST ADULT - CIGARETTES, NUMBER OF YRS
Anesthesia Post Evaluation    Patient: Magali Cantrell    Procedure(s) Performed: Procedure(s) (LRB):  EGD (ESOPHAGOGASTRODUODENOSCOPY) (N/A)    Final Anesthesia Type: general      Level of consciousness: awake and alert  Post-procedure vital signs: reviewed and stable  Pain control: Pain has been treated.  Airway patency: patent    PONV status: Absent or treated.  Anesthetic complications: no      Cardiovascular status: hemodynamically stable  Respiratory status: unassisted  Hydration status: euvolemic            Vitals Value Taken Time   /61 02/28/22 1345   Temp 36.8 °C (98.2 °F) 02/28/22 1345   Pulse 80 02/28/22 1345   Resp 16 02/28/22 1345   SpO2 99 % 02/28/22 1345         No case tracking events are documented in the log.      Pain/Ovidio Score: Ovidio Score: 10 (2/28/2022  1:46 PM)         10

## 2023-03-12 NOTE — ED ADULT NURSE NOTE - NS ED NURSE DC INFO COMPLEXITY
Controlled  Continue antihypertensives   Monitor blood pressure   Simple: Patient demonstrates quick and easy understanding

## 2023-05-03 ENCOUNTER — APPOINTMENT (OUTPATIENT)
Dept: ULTRASOUND IMAGING | Facility: IMAGING CENTER | Age: 83
End: 2023-05-03
Payer: MEDICARE

## 2023-05-03 ENCOUNTER — OUTPATIENT (OUTPATIENT)
Dept: OUTPATIENT SERVICES | Facility: HOSPITAL | Age: 83
LOS: 1 days | End: 2023-05-03
Payer: MEDICARE

## 2023-05-03 DIAGNOSIS — L72.9 FOLLICULAR CYST OF THE SKIN AND SUBCUTANEOUS TISSUE, UNSPECIFIED: Chronic | ICD-10-CM

## 2023-05-03 DIAGNOSIS — Z90.89 ACQUIRED ABSENCE OF OTHER ORGANS: Chronic | ICD-10-CM

## 2023-05-03 DIAGNOSIS — Z00.8 ENCOUNTER FOR OTHER GENERAL EXAMINATION: ICD-10-CM

## 2023-05-03 DIAGNOSIS — Z90.49 ACQUIRED ABSENCE OF OTHER SPECIFIED PARTS OF DIGESTIVE TRACT: Chronic | ICD-10-CM

## 2023-05-03 PROCEDURE — 76536 US EXAM OF HEAD AND NECK: CPT

## 2023-05-03 PROCEDURE — 76536 US EXAM OF HEAD AND NECK: CPT | Mod: 26

## 2023-09-22 NOTE — ASU PREOP CHECKLIST - SITE MARKED BY SURGEON
left left/yes Zoryve Counseling:  I discussed with the patient that Zoryve is not for use in the eyes, mouth or vagina. The most commonly reported side effects include diarrhea, headache, insomnia, application site pain, upper respiratory tract infections, and urinary tract infections.  All of the patient's questions and concerns were addressed.

## 2023-10-17 NOTE — ASU DISCHARGE PLAN (ADULT/PEDIATRIC). - PATIENT BELONGING
Last appointment: 10/13/2023  Next appointment: 4/16/2024  Last refill: 8/4/22  Please advise as DOD.  Thank you patient's belongings returned